# Patient Record
Sex: FEMALE | Race: WHITE | NOT HISPANIC OR LATINO | Employment: UNEMPLOYED | RURAL
[De-identification: names, ages, dates, MRNs, and addresses within clinical notes are randomized per-mention and may not be internally consistent; named-entity substitution may affect disease eponyms.]

---
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
## 2023-03-21 DIAGNOSIS — N30.01 ACUTE CYSTITIS WITH HEMATURIA: Primary | ICD-10-CM

## 2023-03-21 RX ORDER — FLUCONAZOLE 150 MG/1
TABLET ORAL
Qty: 2 TABLET | Refills: 0 | Status: SHIPPED | OUTPATIENT
Start: 2023-03-21

## 2023-03-21 RX ORDER — NITROFURANTOIN 25; 75 MG/1; MG/1
100 CAPSULE ORAL 2 TIMES DAILY
Qty: 14 CAPSULE | Refills: 0 | Status: SHIPPED | OUTPATIENT
Start: 2023-03-21

## 2023-09-29 ENCOUNTER — LAB (OUTPATIENT)
Dept: LAB | Facility: HOSPITAL | Age: 18
End: 2023-09-29
Payer: COMMERCIAL

## 2023-09-29 ENCOUNTER — OFFICE VISIT (OUTPATIENT)
Dept: CARDIOLOGY | Facility: CLINIC | Age: 18
End: 2023-09-29
Payer: COMMERCIAL

## 2023-09-29 ENCOUNTER — TELEPHONE (OUTPATIENT)
Dept: CARDIOLOGY | Facility: CLINIC | Age: 18
End: 2023-09-29

## 2023-09-29 VITALS
WEIGHT: 107.7 LBS | DIASTOLIC BLOOD PRESSURE: 70 MMHG | HEIGHT: 64 IN | HEART RATE: 79 BPM | OXYGEN SATURATION: 96 % | SYSTOLIC BLOOD PRESSURE: 112 MMHG | BODY MASS INDEX: 18.39 KG/M2

## 2023-09-29 DIAGNOSIS — R07.9 CHEST PAIN, UNSPECIFIED TYPE: ICD-10-CM

## 2023-09-29 DIAGNOSIS — R07.9 CHEST PAIN, UNSPECIFIED TYPE: Primary | ICD-10-CM

## 2023-09-29 DIAGNOSIS — R00.2 PALPITATIONS: ICD-10-CM

## 2023-09-29 LAB
ALBUMIN SERPL-MCNC: 5.4 G/DL (ref 3.5–5.2)
ALBUMIN/GLOB SERPL: 1.5 G/DL
ALP SERPL-CCNC: 58 U/L (ref 39–117)
ALT SERPL W P-5'-P-CCNC: 18 U/L (ref 1–33)
ANION GAP SERPL CALCULATED.3IONS-SCNC: 13 MMOL/L (ref 5–15)
AST SERPL-CCNC: 22 U/L (ref 1–32)
BILIRUB SERPL-MCNC: 0.7 MG/DL (ref 0–1.2)
BUN SERPL-MCNC: 11 MG/DL (ref 6–20)
BUN/CREAT SERPL: 12.2 (ref 7–25)
CALCIUM SPEC-SCNC: 10.1 MG/DL (ref 8.6–10.5)
CHLORIDE SERPL-SCNC: 97 MMOL/L (ref 98–107)
CO2 SERPL-SCNC: 26 MMOL/L (ref 22–29)
CREAT SERPL-MCNC: 0.9 MG/DL (ref 0.57–1)
CRP SERPL-MCNC: 0.19 MG/DL (ref 0.01–0.5)
D-DIMER, QUANTITATIVE (MAD,POW, STR): 280 NG/ML (FEU) (ref 0–500)
DEPRECATED RDW RBC AUTO: 37.8 FL (ref 37–54)
EGFRCR SERPLBLD CKD-EPI 2021: 94.6 ML/MIN/1.73
ERYTHROCYTE [DISTWIDTH] IN BLOOD BY AUTOMATED COUNT: 11.9 % (ref 12.3–15.4)
GLOBULIN UR ELPH-MCNC: 3.6 GM/DL
GLUCOSE SERPL-MCNC: 87 MG/DL (ref 65–99)
HBA1C MFR BLD: 5.1 % (ref 4.8–5.6)
HCT VFR BLD AUTO: 47.2 % (ref 34–46.6)
HGB BLD-MCNC: 15.9 G/DL (ref 12–15.9)
MAGNESIUM SERPL-MCNC: 2.3 MG/DL (ref 1.7–2.2)
MCH RBC QN AUTO: 29.3 PG (ref 26.6–33)
MCHC RBC AUTO-ENTMCNC: 33.7 G/DL (ref 31.5–35.7)
MCV RBC AUTO: 86.9 FL (ref 79–97)
PLATELET # BLD AUTO: 289 10*3/MM3 (ref 140–450)
PMV BLD AUTO: 9.7 FL (ref 6–12)
POTASSIUM SERPL-SCNC: 4.3 MMOL/L (ref 3.5–5.2)
PROT SERPL-MCNC: 9 G/DL (ref 6–8.5)
QT INTERVAL: 370 MS
QTC INTERVAL: 424 MS
RBC # BLD AUTO: 5.43 10*6/MM3 (ref 3.77–5.28)
SODIUM SERPL-SCNC: 136 MMOL/L (ref 136–145)
TSH SERPL DL<=0.05 MIU/L-ACNC: 2.45 UIU/ML (ref 0.27–4.2)
WBC NRBC COR # BLD: 5.69 10*3/MM3 (ref 3.4–10.8)

## 2023-09-29 PROCEDURE — 85379 FIBRIN DEGRADATION QUANT: CPT

## 2023-09-29 PROCEDURE — 80053 COMPREHEN METABOLIC PANEL: CPT | Performed by: INTERNAL MEDICINE

## 2023-09-29 PROCEDURE — 93000 ELECTROCARDIOGRAM COMPLETE: CPT | Performed by: INTERNAL MEDICINE

## 2023-09-29 PROCEDURE — 36415 COLL VENOUS BLD VENIPUNCTURE: CPT

## 2023-09-29 PROCEDURE — 85027 COMPLETE CBC AUTOMATED: CPT | Performed by: INTERNAL MEDICINE

## 2023-09-29 PROCEDURE — 83036 HEMOGLOBIN GLYCOSYLATED A1C: CPT | Performed by: INTERNAL MEDICINE

## 2023-09-29 PROCEDURE — 83735 ASSAY OF MAGNESIUM: CPT

## 2023-09-29 PROCEDURE — 84443 ASSAY THYROID STIM HORMONE: CPT

## 2023-09-29 PROCEDURE — 86141 C-REACTIVE PROTEIN HS: CPT

## 2023-09-29 PROCEDURE — 99204 OFFICE O/P NEW MOD 45 MIN: CPT | Performed by: INTERNAL MEDICINE

## 2023-09-29 NOTE — PROGRESS NOTES
[facility] Cardiology  OFFICE NOTE    Cardiovascular Medicine  Nacny Edmond M.D., [facility], FSCAI, RPVI         Jaleesa, Triny Lott, APRN  [address] [id number]    Thank you for asking me to see Antonieta Valle for chest pain.    History of Present Illness  This is a 17 y.o. female with:    1.  Chest pain    Antonieta Valle is a 17 y.o. female who presents for consultation today.  Patient reported that back in July she started having chest pain, she described the pain as dull/burning in character, was localized to the left side of the chest and it started radiating to her back and left shoulder.  She went to urgent care.  Had an x-ray performed which was without any acute cardiopulmonary abnormalities.  She had an EKG performed which showed short AZ interval, due to lead placement she had an RSR pattern in V1 V2.  Since then she has been having intermittent pain few times a week, mostly short lasting up to a couple of minutes, she describes as sharp in character.  Did report worsening with deep breathing at times.  No association with exertion.  No reproducibility on palpation.  Otherwise no positional component.  Pain associated shortness of breath, nausea vomiting diaphoresis.  Report occasional heart racing    Denies any family history of premature coronary artery disease.  She does not play any sports and does not exercise on regular basis but is denying any exertional chest pain shortness of breath or palpitations.  Does not smoke or use any illicit drugs.    Review of Systems - ROS  Constitution: Negative for weakness, weight gain and weight loss.   HENT: Negative for congestion.    Eyes: Negative for blurred vision.   Cardiovascular: As mentioned above  Respiratory: Negative for cough and hemoptysis.    Endocrine: Negative for polydipsia and polyuria.   Hematologic/Lymphatic: Negative for bleeding problem. Does not bruise/bleed easily.   Skin: Negative for  "flushing.   Musculoskeletal: Negative for neck pain and stiffness.   Gastrointestinal: Negative for abdominal pain, diarrhea, jaundice, melena, nausea and vomiting.   Genitourinary: Negative for dysuria and hematuria.   Neurological: Negative for dizziness, focal weakness and numbness.   Psychiatric/Behavioral: Negative for altered mental status and depression.          All other systems were reviewed and were negative.    family history includes Hypertension in her maternal grandfather.     reports that she has never smoked. She does not have any smokeless tobacco history on file. She reports that she does not drink alcohol and does not use drugs.    No Known Allergies      Current Outpatient Medications:     norethindrone-ethinyl estradiol FE (Junel FE 1/20) 1-20 MG-MCG per tablet, Take 1 tablet by mouth Daily., Disp: 84 tablet, Rfl: 4    fluconazole (Diflucan) 150 MG tablet, After completing antibiotic, Take 1 tablet by mouth and repeat in 4 days. (Patient not taking: Reported on 9/29/2023), Disp: 2 tablet, Rfl: 0    nitrofurantoin, macrocrystal-monohydrate, (Macrobid) 100 MG capsule, Take 1 capsule by mouth 2 (Two) Times a Day. (Patient not taking: Reported on 9/29/2023), Disp: 14 capsule, Rfl: 0    Physical Exam:  Vitals:    09/29/23 0805 09/29/23 0806   BP: 106/70 112/70   BP Location: Left arm Right arm   Patient Position: Sitting Sitting   Cuff Size: Adult Adult   Pulse: 79    SpO2: 96%    Weight: 48.9 kg (107 lb 11.2 oz)    Height: 162.6 cm (64\")    PainSc: 0-No pain    PainLoc: Chest      Current Pain Level: none  Pulse Ox: Normal  on room air  General: alert, appears stated age and cooperative     Body Habitus: well-nourished    HEENT: Head: Normocephalic, no lesions, without obvious abnormality. No arcus senilis, xanthelasma or xanthomas.    Neuro: alert, oriented x3  Pulses: 2+ and symmetric  JVP: Volume/Pulsation: Normal.  Normal waveforms.   Appropriate inspiratory decrease.  No Kussmaul's. No " Cheo's.   Carotid Exam: no bruit normal pulsation bilaterally   Carotid Volume: normal.     Respirations: no increased work of breathing   Chest:  Normal    Pulmonary:Normal   Precordium: Normal impulses. P2 is not palpable.  RV Heave: absent  LV Heave: absent  [place]:  normal size and placement  Palpable S4: absent.  Heart rate: normal    Heart Rhythm: regular     Heart Sounds: S1: normal  S2: normal  S3: absent   S4: absent  Opening Snap: absent    Pericardial Rub:  Absent: .    Abdomen:   Appearance: normal .  Palpation: Soft, non-tender to palpation, bowel sounds positive in all four quadrants; no guarding or rebound tenderness  Extremity: no edema.   LE Skin: no rashes  LE Hair:  normal  LE Pulses: well perfused with normal pulses in the distal extremities  Pallor on elevation: Absent. Rubor on dependency: None      DATA REVIEWED:     EKG. I personally reviewed and interpreted the EKG.  Normal sinus rhythm with short AZ interval, nonspecific ST-T changes.    ECG/EMG Results (all)       Procedure Component Value Units Date/Time    ECG 12 Lead [[id number]] Collected: 09/29/23 0753     Updated: 09/29/23 0803     QT Interval 370 ms      QTC Interval 424 ms     Narrative:      Test Reason : chest pain  Blood Pressure :   */*   mmHG  Vent. Rate :  79 BPM     Atrial Rate :  79 BPM     P-R Int : 104 ms          QRS Dur :  88 ms      QT Int : 370 ms       P-R-T Axes :  66  63  33 degrees     QTc Int : 424 ms    Sinus rhythm with sinus arrhythmia with short AZ  Nonspecific ST abnormality  Abnormal ECG  No previous ECGs available    Referred By:            Confirmed By:           ---------------------------------------------------  TTE/JAZMINE:    ----------------------------------------------------      --------------------------------------------------------------------------------------------------  LABS:     The CVD Risk score (Macie et al., 2008) failed to calculate for the following reasons:    The 2008 CVD risk  score is only valid for ages 30 to 74         No results found for: GLUCOSE, BUN, CREATININE, EGFRIFNONA, EGFRIFAFRI, BCR, K, CO2, CALCIUM, PROTENTOTREF, ALBUMIN, LABIL2, BILIRUBIN, AST, ALT  No results found for: WBC, HGB, HCT, MCV, PLT  No results found for: CHOL, CHLPL, TRIG, HDL, LDL, LDLDIRECT  No results found for: TSH, A3JXBNE, W5BGTMH, THYROIDAB  No results found for: CKTOTAL, CKMB, CKMBINDEX, TROPONINI, TROPONINT  No results found for: HGBA1C  No results found for: DDIMER  No results found for: ALT  No results found for: HGBA1C  No results found for: GLUF, MICROALBUR, CREATININE  No results found for: IRON, TIBC, FERRITIN  No results found for: INR, PROTIME    [unfilled]    1. Chest pain:  No significant risk factors.  Chest x-ray without acute etiologies back in July.  EKG without significant ischemic changes.  Chest pain appears noncardiac and atypical in character.  Component of pleurisy    We will plan on getting an echocardiogram to assess for structural abnormalities.  We will get a Holter monitor to assess for any arrhythmias  Getting basic blood work including a CBC, inflammatory markers, electrolytes and TSH.    Mom had mentioned intermittent discoloration in her toes when temperature was cold.  On exam she does not have any radial radial, radial femoral delay.  +2 pulses in DP and PT.    If recurrent chest pain pending eval work-up, can consider treadmill stress test to assess for potential anomalous coronary arteries.  Low clinical suspicion for life-threatening abnormalities including dissection or PE.    Short WI interval noted on EKG, no evidence of delta wave to suggest preexcitation.      Prevention:  BMI is below normal parameters (malnutrition). Recommendations: referral to primary care      Antonieta Adrianne Valle  reports that she has never smoked. She does not have any smokeless tobacco history on file..           This document has been electronically signed by Nancy Edmond MD on  September 29, 2023 08:14 CDT

## 2023-09-29 NOTE — TELEPHONE ENCOUNTER
----- Message from Nancy Edmond MD sent at 9/29/2023  2:16 PM CDT -----  Labs are all reassuring.  CRP is normal.  ----- Message -----  From: Lab, Background User  Sent: 9/29/2023   9:19 AM CDT  To: Nancy Edmond MD    Patient given lab results per dr. Edmond.